# Patient Record
Sex: MALE | Race: WHITE
[De-identification: names, ages, dates, MRNs, and addresses within clinical notes are randomized per-mention and may not be internally consistent; named-entity substitution may affect disease eponyms.]

---

## 2018-07-27 ENCOUNTER — HOSPITAL ENCOUNTER (EMERGENCY)
Dept: HOSPITAL 25 - UCCORT | Age: 33
Discharge: HOME | End: 2018-07-27
Payer: COMMERCIAL

## 2018-07-27 DIAGNOSIS — Z79.891: ICD-10-CM

## 2018-07-27 DIAGNOSIS — Z88.1: ICD-10-CM

## 2018-07-27 DIAGNOSIS — L03.012: Primary | ICD-10-CM

## 2018-07-27 PROCEDURE — 99202 OFFICE O/P NEW SF 15 MIN: CPT

## 2018-07-27 PROCEDURE — G0463 HOSPITAL OUTPT CLINIC VISIT: HCPCS

## 2018-07-27 NOTE — UC
Skin Complaint HPI





- HPI Summary


HPI Summary: 


Patient is here with concern of left index finger infection.  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  He has had the finger nail removed 

twice and has never grown back normal.  He trimmed the nail very short and 

since then has developed redness and some swelling.  Denies any fevers chills.  

No discharge.  He is from [place] and driving through the area to get to the 

[facility] to the beach with his friends and decided to stop here.








- History of Current Complaint


Time Seen by Provider: 07/27/18 19:52


Stated Complaint: LEFT FINGER COMPLAINT


Pain Intensity: 1





- Allergy/Home Medications


Allergies/Adverse Reactions: 


 Allergies











Allergy/AdvReac Type Severity Reaction Status Date / Time


 


Sulfa (Sulfonamide Allergy  Hives Verified 07/27/18 19:52





Antibiotics)     














Review of Systems


Constitutional: Negative


Skin: Other - Redness


Eyes: Negative


ENT: Negative


Respiratory: Negative


Cardiovascular: Negative


Gastrointestinal: Negative


Genitourinary: Negative


Motor: Negative


Neurovascular: Negative


Musculoskeletal: Negative


Neurological: Negative


Psychological: Negative


Is Patient Immunocompromised?: No


All Other Systems Reviewed And Are Negative: Yes





PMH/Surg Hx/FS Hx/Imm Hx


Previously Healthy: Yes





- Surgical History


Surgical History: Yes


Surgery Procedure, Year, and Place: umbilical hernia sx





- Social History


Alcohol Use: Occasionally


Substance Use Type: None


Smoking Status (MU): Former Smoker


Type: eCigarettes


When Did the Patient Quit Smoking/Using Tobacco: approx 2 yrs ago





Physical Exam


Triage Information Reviewed: Yes


Appearance: Well-Appearing, No Pain Distress, Well-Nourished


Vital Signs: 


 Initial Vital Signs











Temp  98.9 F   07/27/18 19:52


 


Pulse  85   07/27/18 19:52


 


Resp  16   07/27/18 19:52


 


BP  148/57   07/27/18 19:52


 


Pulse Ox  100   07/27/18 19:52











Respiratory Exam: Normal


Respiratory: Positive: Lungs clear


Cardiovascular Exam: Normal


Cardiovascular: Positive: RRR


Musculoskeletal Exam: Normal


Neurological Exam: Normal


Psychological Exam: Normal


Skin: Positive: Other - left index finger with erythema at proximal redness. no 

fluctuance. no d.c, nail cut very short.





Course/Dx





- Differential Diagnoses - Skin Complaint


Differential Diagnoses: Cellulitis





- Diagnoses


Provider Diagnoses: left index finger cellulitis





Discharge





- Sign-Out/Discharge


Documenting (check all that apply): Patient Departure





- Discharge Plan


Condition: Stable


Disposition: HOME


Prescriptions: 


Doxycycline Monohydrate 100 mg PO BID #20 cap


Patient Education Materials:  Cellulitis (ED)


Referrals: 


No Primary Care Phys,NOPCP [Primary Care Provider] - 


Additional Instructions: 


Make sure to take a probiotic daily while on antibiotics to help prevent a 

potential complication of antibiotic use called c diff. Some well known brands 

that can be found OTC are florastor, align and colon health.  Make sure to 

complete the entire prescription unless advised otherwise by your health care 

provider. 


-You should be seen at an urgent care while on your trip if you see white thick 

fluid/puss collecting under the skin or you develop wroening redness, fevers, 

chills, night sweats


-Follow up with your PCP when you are back in [place]. 





- Billing Disposition and Condition


Condition: STABLE


Disposition: Home